# Patient Record
Sex: FEMALE | Race: BLACK OR AFRICAN AMERICAN
[De-identification: names, ages, dates, MRNs, and addresses within clinical notes are randomized per-mention and may not be internally consistent; named-entity substitution may affect disease eponyms.]

---

## 2020-01-04 ENCOUNTER — HOSPITAL ENCOUNTER (INPATIENT)
Dept: HOSPITAL 19 - LDRO | Age: 40
LOS: 2 days | Discharge: HOME | End: 2020-01-06
Payer: MEDICAID

## 2020-01-04 VITALS — HEIGHT: 67.01 IN | WEIGHT: 176.37 LBS | BODY MASS INDEX: 27.68 KG/M2

## 2020-01-04 DIAGNOSIS — Z3A.39: ICD-10-CM

## 2020-01-04 DIAGNOSIS — G89.29: ICD-10-CM

## 2020-01-04 DIAGNOSIS — Z86.718: ICD-10-CM

## 2020-01-04 DIAGNOSIS — Z86.711: ICD-10-CM

## 2020-01-04 NOTE — NUR
2340- Pt arrived on unit ambulatory, escorted by family and with complaints of
contractions. Pt oriented to room and changed into a gown.
 
2345- Pt reports contractions started a couple hours prior. Pt denies any
leaking of fluid or vaginal bleeding and reports normal fetal movement. EFM
and toco monitors started. Vital signs WNL. SVE by this RN 4-5/75/-2l.
 
2353- EFM tracing maternal HR as coorelates with SPO2 monitor due frequent
movement and position changes.
 
0010- Spoke with Dr. Queen for an update on pt's arrival, FHR tracing and
SVE reviewed. Orders for labor admission received.
 
0015- FHR tracing difficult due to maternal movement. Audible fetal movement
and frequent attempts to adjust EFM. Spoke with MANJEET Bowen regarding pt's
arrival and request for epidural.
 
0030- Multiple unsuccessful IV attempts.
 
0040- MANJEET Bowen at the bedside to assist with IV and epidural placement.
 
0047- Pt off EFM and toco monitors to bathroom.

## 2020-01-05 VITALS — HEART RATE: 82 BPM | DIASTOLIC BLOOD PRESSURE: 64 MMHG | SYSTOLIC BLOOD PRESSURE: 105 MMHG

## 2020-01-05 VITALS — SYSTOLIC BLOOD PRESSURE: 108 MMHG | DIASTOLIC BLOOD PRESSURE: 69 MMHG | HEART RATE: 88 BPM

## 2020-01-05 VITALS — SYSTOLIC BLOOD PRESSURE: 126 MMHG | DIASTOLIC BLOOD PRESSURE: 67 MMHG | HEART RATE: 71 BPM

## 2020-01-05 VITALS — SYSTOLIC BLOOD PRESSURE: 107 MMHG | DIASTOLIC BLOOD PRESSURE: 59 MMHG | HEART RATE: 76 BPM

## 2020-01-05 VITALS — SYSTOLIC BLOOD PRESSURE: 111 MMHG | DIASTOLIC BLOOD PRESSURE: 61 MMHG | HEART RATE: 81 BPM

## 2020-01-05 VITALS — DIASTOLIC BLOOD PRESSURE: 65 MMHG | TEMPERATURE: 97.7 F | SYSTOLIC BLOOD PRESSURE: 116 MMHG | HEART RATE: 76 BPM

## 2020-01-05 VITALS — HEART RATE: 77 BPM | DIASTOLIC BLOOD PRESSURE: 78 MMHG | SYSTOLIC BLOOD PRESSURE: 121 MMHG

## 2020-01-05 VITALS — SYSTOLIC BLOOD PRESSURE: 107 MMHG | DIASTOLIC BLOOD PRESSURE: 55 MMHG | HEART RATE: 92 BPM

## 2020-01-05 VITALS — HEART RATE: 89 BPM | SYSTOLIC BLOOD PRESSURE: 122 MMHG | DIASTOLIC BLOOD PRESSURE: 57 MMHG | TEMPERATURE: 98.3 F

## 2020-01-05 VITALS — SYSTOLIC BLOOD PRESSURE: 110 MMHG | HEART RATE: 76 BPM | DIASTOLIC BLOOD PRESSURE: 52 MMHG

## 2020-01-05 VITALS — DIASTOLIC BLOOD PRESSURE: 53 MMHG | HEART RATE: 75 BPM | SYSTOLIC BLOOD PRESSURE: 97 MMHG

## 2020-01-05 VITALS — SYSTOLIC BLOOD PRESSURE: 92 MMHG | DIASTOLIC BLOOD PRESSURE: 50 MMHG | HEART RATE: 118 BPM

## 2020-01-05 VITALS — HEART RATE: 76 BPM | DIASTOLIC BLOOD PRESSURE: 62 MMHG | SYSTOLIC BLOOD PRESSURE: 107 MMHG

## 2020-01-05 VITALS — SYSTOLIC BLOOD PRESSURE: 110 MMHG | HEART RATE: 90 BPM | DIASTOLIC BLOOD PRESSURE: 59 MMHG

## 2020-01-05 VITALS — HEART RATE: 82 BPM | SYSTOLIC BLOOD PRESSURE: 104 MMHG | DIASTOLIC BLOOD PRESSURE: 56 MMHG

## 2020-01-05 VITALS — HEART RATE: 86 BPM | SYSTOLIC BLOOD PRESSURE: 114 MMHG | DIASTOLIC BLOOD PRESSURE: 60 MMHG

## 2020-01-05 VITALS — HEART RATE: 83 BPM | SYSTOLIC BLOOD PRESSURE: 103 MMHG | DIASTOLIC BLOOD PRESSURE: 51 MMHG

## 2020-01-05 VITALS — HEART RATE: 100 BPM | SYSTOLIC BLOOD PRESSURE: 138 MMHG | DIASTOLIC BLOOD PRESSURE: 72 MMHG

## 2020-01-05 VITALS — DIASTOLIC BLOOD PRESSURE: 65 MMHG | SYSTOLIC BLOOD PRESSURE: 131 MMHG | HEART RATE: 119 BPM

## 2020-01-05 VITALS — HEART RATE: 88 BPM | DIASTOLIC BLOOD PRESSURE: 53 MMHG | SYSTOLIC BLOOD PRESSURE: 97 MMHG

## 2020-01-05 VITALS — DIASTOLIC BLOOD PRESSURE: 58 MMHG | HEART RATE: 89 BPM | SYSTOLIC BLOOD PRESSURE: 103 MMHG

## 2020-01-05 VITALS — DIASTOLIC BLOOD PRESSURE: 71 MMHG | SYSTOLIC BLOOD PRESSURE: 121 MMHG | HEART RATE: 109 BPM

## 2020-01-05 VITALS — DIASTOLIC BLOOD PRESSURE: 70 MMHG | HEART RATE: 70 BPM | SYSTOLIC BLOOD PRESSURE: 118 MMHG

## 2020-01-05 VITALS — DIASTOLIC BLOOD PRESSURE: 72 MMHG | SYSTOLIC BLOOD PRESSURE: 108 MMHG | HEART RATE: 114 BPM

## 2020-01-05 VITALS — HEART RATE: 106 BPM | SYSTOLIC BLOOD PRESSURE: 110 MMHG | DIASTOLIC BLOOD PRESSURE: 61 MMHG

## 2020-01-05 VITALS — TEMPERATURE: 98.4 F | SYSTOLIC BLOOD PRESSURE: 120 MMHG | DIASTOLIC BLOOD PRESSURE: 73 MMHG | HEART RATE: 81 BPM

## 2020-01-05 VITALS — HEART RATE: 81 BPM | SYSTOLIC BLOOD PRESSURE: 89 MMHG | TEMPERATURE: 98.4 F | DIASTOLIC BLOOD PRESSURE: 47 MMHG

## 2020-01-05 VITALS — SYSTOLIC BLOOD PRESSURE: 100 MMHG | HEART RATE: 90 BPM | DIASTOLIC BLOOD PRESSURE: 54 MMHG

## 2020-01-05 VITALS — SYSTOLIC BLOOD PRESSURE: 110 MMHG | HEART RATE: 75 BPM | DIASTOLIC BLOOD PRESSURE: 56 MMHG

## 2020-01-05 VITALS — DIASTOLIC BLOOD PRESSURE: 69 MMHG | HEART RATE: 108 BPM | SYSTOLIC BLOOD PRESSURE: 113 MMHG

## 2020-01-05 VITALS — HEART RATE: 83 BPM | SYSTOLIC BLOOD PRESSURE: 92 MMHG | DIASTOLIC BLOOD PRESSURE: 61 MMHG

## 2020-01-05 VITALS — DIASTOLIC BLOOD PRESSURE: 82 MMHG | HEART RATE: 92 BPM | SYSTOLIC BLOOD PRESSURE: 127 MMHG

## 2020-01-05 VITALS — SYSTOLIC BLOOD PRESSURE: 111 MMHG | DIASTOLIC BLOOD PRESSURE: 68 MMHG | TEMPERATURE: 97.4 F | HEART RATE: 72 BPM

## 2020-01-05 VITALS — SYSTOLIC BLOOD PRESSURE: 103 MMHG | DIASTOLIC BLOOD PRESSURE: 51 MMHG | TEMPERATURE: 97.6 F | HEART RATE: 84 BPM

## 2020-01-05 VITALS — HEART RATE: 70 BPM | SYSTOLIC BLOOD PRESSURE: 124 MMHG | DIASTOLIC BLOOD PRESSURE: 73 MMHG

## 2020-01-05 VITALS — SYSTOLIC BLOOD PRESSURE: 132 MMHG | DIASTOLIC BLOOD PRESSURE: 72 MMHG | HEART RATE: 82 BPM

## 2020-01-05 VITALS — HEART RATE: 145 BPM | SYSTOLIC BLOOD PRESSURE: 135 MMHG | DIASTOLIC BLOOD PRESSURE: 88 MMHG

## 2020-01-05 VITALS — SYSTOLIC BLOOD PRESSURE: 108 MMHG | DIASTOLIC BLOOD PRESSURE: 67 MMHG

## 2020-01-05 VITALS — DIASTOLIC BLOOD PRESSURE: 58 MMHG | HEART RATE: 82 BPM | SYSTOLIC BLOOD PRESSURE: 106 MMHG

## 2020-01-05 VITALS — SYSTOLIC BLOOD PRESSURE: 106 MMHG | DIASTOLIC BLOOD PRESSURE: 63 MMHG | HEART RATE: 87 BPM

## 2020-01-05 VITALS — DIASTOLIC BLOOD PRESSURE: 69 MMHG | SYSTOLIC BLOOD PRESSURE: 124 MMHG | HEART RATE: 74 BPM

## 2020-01-05 VITALS — SYSTOLIC BLOOD PRESSURE: 108 MMHG | DIASTOLIC BLOOD PRESSURE: 56 MMHG | HEART RATE: 76 BPM

## 2020-01-05 VITALS — DIASTOLIC BLOOD PRESSURE: 57 MMHG | SYSTOLIC BLOOD PRESSURE: 113 MMHG | HEART RATE: 75 BPM

## 2020-01-05 VITALS — HEART RATE: 78 BPM | DIASTOLIC BLOOD PRESSURE: 74 MMHG | SYSTOLIC BLOOD PRESSURE: 115 MMHG

## 2020-01-05 VITALS — SYSTOLIC BLOOD PRESSURE: 130 MMHG | DIASTOLIC BLOOD PRESSURE: 65 MMHG | HEART RATE: 71 BPM

## 2020-01-05 VITALS — HEART RATE: 76 BPM | DIASTOLIC BLOOD PRESSURE: 57 MMHG | SYSTOLIC BLOOD PRESSURE: 100 MMHG

## 2020-01-05 LAB
BASOPHILS # BLD: 0 10*3/UL (ref 0–0.2)
BASOPHILS NFR BLD AUTO: 0.2 % (ref 0–2)
EOSINOPHIL # BLD: 0.1 10*3/UL (ref 0–0.7)
EOSINOPHIL NFR BLD: 0.6 % (ref 0–4)
ERYTHROCYTE [DISTWIDTH] IN BLOOD BY AUTOMATED COUNT: 16.3 % (ref 11.5–14.5)
GRANULOCYTES # BLD AUTO: 75.2 % (ref 42.2–75.2)
HCT VFR BLD AUTO: 33.8 % (ref 37–47)
HGB BLD-MCNC: 10.5 G/DL (ref 12.5–16)
LYMPHOCYTES # BLD: 1.5 10*3/UL (ref 1.2–3.4)
LYMPHOCYTES NFR BLD: 17.2 % (ref 20–51)
MCH RBC QN AUTO: 23 PG (ref 27–31)
MCHC RBC AUTO-ENTMCNC: 31 G/DL (ref 33–37)
MCV RBC AUTO: 74 FL (ref 80–100)
MONOCYTES # BLD: 0.5 10*3/UL (ref 0.1–0.6)
MONOCYTES NFR BLD AUTO: 6.3 % (ref 1.7–9.3)
NEUTROPHILS # BLD: 6.4 10*3/UL (ref 1.4–6.5)
PLATELET # BLD AUTO: 256 K/MM3 (ref 130–400)
PMV BLD AUTO: 11.4 FL (ref 7.4–10.4)
RBC # BLD AUTO: 4.55 M/MM3 (ref 4.1–5.3)

## 2020-01-05 NOTE — NUR
0053- MANJEET Bowen at the bedside. Pt positioned sitting up on edge of the
bed for epidural placement. SPO2 monitor started. EFM tracing maternal HR as
coorelates with SPO2 monitor.
 
0104- Single shot per CRNA. See anesthesia records for details.
 
0107- Test dose given per CRNA. See anesthesia records for details.
 
0120- Pt repositioned back in bed with left wedge. EFM and toco monitors
adjusted.
 
0127- Pt reports not feeling well.
 
0128- Pt passed out. Additional staff and MANJEET Bowen called to bedside for
assistance. SPO2 monitor started. O2 at 10L via facemask started.
 
0129- Pt recovered quickly. Vital signs WNL.
 
0139- Pt repositioned with right wedge.
 
0144- Pt reports not feeling well. BP 79/43 P 85. IV fluids increased.
MANJEET Bowen at the bedside for assistance.
 
0148- Ephedrine given. See EMAR for details.
 
0150- Pt passed out and recovered quickly. Vital signs improving.
 
0151- Ephedrine given per MANJEET Bowen. See EMAR for details.
 
0153- Ephedrine given per MANJEET Bowen. See EMAR for details.
 
0154- Repositioned pt to left lateral. EFM and toco monitors adjusted.
 
0155- Emesis x1.
 
0205- Difficult to determine FHR baseline due to marked variability.
 
0225- Retana placed without complications.
 
0230- Repositioned pt to right lateral. Difficult to determine FHR baseline
due to marked variability.
 
0240- Difficult to determine FHR baseline due to marked varibility.
 
0310- IV not working due to infiltrate. Andrés call to bedside for
assistance.
 
0348- IV started and fluids infusing.
 
0357- SVE by this RN with no change.
 
0400- Spoke with Dr. Queen for an update on pt's status with SVE, FHR
tracing and ctx pattern reviewed. Orders to started pitocin at 5am if pt not
derek.

## 2020-01-05 NOTE — NUR
0635-Recieved bedside shift report from AGUEDA Yañez. FHR tracing
intermittent at this time, adjusted EFM.
0645-SVE 5/80/-2, Updated patient on plan of care and plan to start pitocin
per Dr. Queen order.
0710-Pitocin at 2mu per protocol and order.

## 2020-01-05 NOTE — NUR
1450-Patient unable to lift LLE and hold, was placed on bedpan and voids 250ml
1530-Pivot to wheelchair with assist x2, taken to PP Room 215, easily pivots
to Bed with assist x2. Oriented to room Updated on safety and plan of care.

## 2020-01-05 NOTE — NUR
1055-SVE 8/90/0 Bedside Sonon again by Dr. Polanco, OP positionied confirmed
by MD, Dr. Polanco discussed options for turning infant.
 
1135-SVE by MD 10/100/0, set up for delivery.
1145-Patient begins pushing with MD at bedside, moves vertex well. Decel in
FHR down to 90bpm following pushign efforts, slow return to baseline over
80seconds.
1148-Pulse ox placed on maternal index finger, -155bpm, maternal HR
125-135bpm. Patient continues to move vertex well.
1206-Spontaneous delivery of infant head immediately followed by infant body.
Viable female infant to mothers abdomen, nares and mouth bulb suctioned by MD.
Cord clamped x2 and cut by father of infant. Care of infant assumed by
AGUEDA Burden.
1210-Spontaneous delivery of intact placenta by Dr. Polanco. Fundal massage
firm. Lochia WNL, EBL 300ml, Pitocin bolus per MD order and protocol. Hyun
care provided. Perineum intact. Updated on safety and plan of care.

## 2020-01-05 NOTE — NUR
0750-Dr. Polanco on unit. Reviews chart and FHR monitor. In to see patient
and discuss plan of care. Bedside sono by MD, vertex confirmed.
0800-SVE by MD, 5/80/-2, AROM clear fluid. Hyun care provided, Repositioned
WR.
0828-Variable decel down to 100bpm, spontaneous return to baseline with
difficulty tracing FHR Audible FHR 140bpm, RN at bedsdie frequently
readjusting EFM. Repostioined WL
0840-Difficulty tracing FHR, RN adjusting EFM.

## 2020-01-06 VITALS — HEART RATE: 104 BPM | SYSTOLIC BLOOD PRESSURE: 112 MMHG | DIASTOLIC BLOOD PRESSURE: 71 MMHG | TEMPERATURE: 98 F

## 2020-01-06 VITALS — DIASTOLIC BLOOD PRESSURE: 64 MMHG | HEART RATE: 78 BPM | TEMPERATURE: 98.2 F | SYSTOLIC BLOOD PRESSURE: 116 MMHG

## 2020-01-06 VITALS — DIASTOLIC BLOOD PRESSURE: 64 MMHG | TEMPERATURE: 98.2 F | HEART RATE: 78 BPM | SYSTOLIC BLOOD PRESSURE: 114 MMHG

## 2020-01-06 LAB
HCT VFR BLD AUTO: 22.8 % (ref 37–47)
HGB BLD-MCNC: 7.1 G/DL (ref 12.5–16)

## 2020-01-06 NOTE — NUR
Initial visit; Mom thanked  for offering congratulations and God's
vlessings for the birth of her daughter.  thanked family for choosing
[facility].